# Patient Record
Sex: FEMALE | Race: BLACK OR AFRICAN AMERICAN | URBAN - METROPOLITAN AREA
[De-identification: names, ages, dates, MRNs, and addresses within clinical notes are randomized per-mention and may not be internally consistent; named-entity substitution may affect disease eponyms.]

---

## 2021-08-24 ENCOUNTER — OFFICE VISIT (OUTPATIENT)
Dept: FAMILY MEDICINE CLINIC | Age: 36
End: 2021-08-24
Payer: COMMERCIAL

## 2021-08-24 VITALS
HEART RATE: 80 BPM | OXYGEN SATURATION: 98 % | HEIGHT: 70 IN | SYSTOLIC BLOOD PRESSURE: 120 MMHG | DIASTOLIC BLOOD PRESSURE: 70 MMHG | WEIGHT: 176 LBS | TEMPERATURE: 98.1 F | BODY MASS INDEX: 25.2 KG/M2

## 2021-08-24 DIAGNOSIS — Z86.19 HISTORY OF HELICOBACTER PYLORI INFECTION: ICD-10-CM

## 2021-08-24 DIAGNOSIS — H81.13 BENIGN PAROXYSMAL POSITIONAL VERTIGO DUE TO BILATERAL VESTIBULAR DISORDER: ICD-10-CM

## 2021-08-24 DIAGNOSIS — Z13.220 SCREENING, LIPID: ICD-10-CM

## 2021-08-24 DIAGNOSIS — K21.9 GASTROESOPHAGEAL REFLUX DISEASE, UNSPECIFIED WHETHER ESOPHAGITIS PRESENT: Primary | ICD-10-CM

## 2021-08-24 DIAGNOSIS — Z13.1 SCREENING FOR DIABETES MELLITUS: ICD-10-CM

## 2021-08-24 PROCEDURE — 99204 OFFICE O/P NEW MOD 45 MIN: CPT | Performed by: FAMILY MEDICINE

## 2021-08-24 RX ORDER — MECLIZINE HCL 12.5 MG/1
12.5 TABLET ORAL 3 TIMES DAILY PRN
Qty: 30 TABLET | Refills: 0 | Status: SHIPPED | OUTPATIENT
Start: 2021-08-24 | End: 2021-09-03

## 2021-08-24 RX ORDER — OMEPRAZOLE 40 MG/1
40 CAPSULE, DELAYED RELEASE ORAL
Qty: 30 CAPSULE | Refills: 2 | Status: SHIPPED | OUTPATIENT
Start: 2021-08-24

## 2021-08-24 SDOH — ECONOMIC STABILITY: FOOD INSECURITY: WITHIN THE PAST 12 MONTHS, YOU WORRIED THAT YOUR FOOD WOULD RUN OUT BEFORE YOU GOT MONEY TO BUY MORE.: NEVER TRUE

## 2021-08-24 SDOH — ECONOMIC STABILITY: FOOD INSECURITY: WITHIN THE PAST 12 MONTHS, THE FOOD YOU BOUGHT JUST DIDN'T LAST AND YOU DIDN'T HAVE MONEY TO GET MORE.: NEVER TRUE

## 2021-08-24 ASSESSMENT — PATIENT HEALTH QUESTIONNAIRE - PHQ9
SUM OF ALL RESPONSES TO PHQ QUESTIONS 1-9: 0
SUM OF ALL RESPONSES TO PHQ QUESTIONS 1-9: 0
2. FEELING DOWN, DEPRESSED OR HOPELESS: 0
SUM OF ALL RESPONSES TO PHQ9 QUESTIONS 1 & 2: 0
1. LITTLE INTEREST OR PLEASURE IN DOING THINGS: 0
SUM OF ALL RESPONSES TO PHQ QUESTIONS 1-9: 0

## 2021-08-24 ASSESSMENT — ENCOUNTER SYMPTOMS
SORE THROAT: 0
BLOOD IN STOOL: 0
NAUSEA: 0
DIARRHEA: 0
VOMITING: 0
RHINORRHEA: 0
ABDOMINAL PAIN: 1
SHORTNESS OF BREATH: 0
CONSTIPATION: 0
CHEST TIGHTNESS: 0

## 2021-08-24 ASSESSMENT — SOCIAL DETERMINANTS OF HEALTH (SDOH): HOW HARD IS IT FOR YOU TO PAY FOR THE VERY BASICS LIKE FOOD, HOUSING, MEDICAL CARE, AND HEATING?: NOT HARD AT ALL

## 2021-08-24 NOTE — PATIENT INSTRUCTIONS
Patient Education     Benign Paroxysmal Positional Vertigo (BPPV): Care Instructions  Your Care Instructions     Benign paroxysmal positional vertigo, also called BPPV, is an inner ear problem. It causes a spinning or whirling sensation when you move your head. This sensation is called vertigo. The vertigo usually lasts for less than a minute. People often have vertigo spells for a few days or weeks. Then the vertigo goes away. But it may come back again. The vertigo may be mild, or it may be bad enough to cause unsteadiness, nausea, and vomiting. When you move, your inner ear sends messages to the brain. This helps you keep your balance. Vertigo can happen when debris builds up in the inner ear. The buildup can cause the inner ear to send the wrong message to the brain. Your doctor may move you in different positions to help your vertigo get better faster. This is called the Epley maneuver. Your doctor may also prescribe medicines or exercises to help with your symptoms. Follow-up care is a key part of your treatment and safety. Be sure to make and go to all appointments, and call your doctor if you are having problems. It's also a good idea to know your test results and keep a list of the medicines you take. How can you care for yourself at home? · If your doctor suggests that you do Harp-Daroff exercises:  ? Sit on the edge of a bed or sofa. Quickly lie down on the side that causes the worst vertigo. Lie on your side with your ear down. ? Stay in this position for at least 30 seconds or until the vertigo goes away. ? Sit up. If this causes vertigo, wait for it to stop. ? Repeat the procedure on the other side. ? Repeat this 10 times. Do these exercises 2 times a day until the vertigo is gone. When should you call for help? Call 911 anytime you think you may need emergency care. For example, call if:    · You have symptoms of a stroke.  These may include:  ? Sudden numbness, tingling, weakness, or loss of movement in your face, arm, or leg, especially on only one side of your body. ? Sudden vision changes. ? Sudden trouble speaking. ? Sudden confusion or trouble understanding simple statements. ? Sudden problems with walking or balance. ? A sudden, severe headache that is different from past headaches. Call your doctor now or seek immediate medical care if:    · You have new or worse nausea and vomiting.     · You have new symptoms such as hearing loss or roaring in your ears. Watch closely for changes in your health, and be sure to contact your doctor if:    · You are not getting better as expected.     · Your vertigo gets worse. Where can you learn more? Go to https://[employer] Vehiclespepiceweb.88tc88. org and sign in to your Pocket [employer] account. Enter  in the [employer] box to learn more about \"Benign Paroxysmal Positional Vertigo (BPPV): Care Instructions. \"     If you do not have an account, please click on the \"Sign Up Now\" link. Current as of: December 2, 2020               Content Version: 12.9  © 2044-7721 [employer]wise, Incorporated. Care instructions adapted under license by [facility] ([facility]). If you have questions about a medical condition or this instruction, always ask your healthcare professional. [place] [address] any warranty or liability for your use of this information.

## 2021-08-31 ENCOUNTER — TELEPHONE (OUTPATIENT)
Dept: FAMILY MEDICINE CLINIC | Age: 36
End: 2021-08-31

## 2021-08-31 NOTE — TELEPHONE ENCOUNTER
Pt  has been advised that pt has no anemia. Also advised that H. Pylori Breath Test is active and was ordered.

## 2021-09-06 LAB — H PYLORI BREATH TEST: NEGATIVE

## 2021-09-13 ENCOUNTER — HOSPITAL ENCOUNTER (OUTPATIENT)
Dept: PHYSICAL THERAPY | Age: 36
Setting detail: THERAPIES SERIES
Discharge: HOME | End: 2021-09-13
Payer: COMMERCIAL

## 2021-09-13 PROCEDURE — 95992 CANALITH REPOSITIONING PROC: CPT

## 2021-09-13 PROCEDURE — 97530 THERAPEUTIC ACTIVITIES: CPT

## 2021-09-13 PROCEDURE — 97162 PT EVAL MOD COMPLEX 30 MIN: CPT

## 2021-09-13 NOTE — PLAN OF CARE
[facility] Outpatient [facility]  [address] [place] [address], [address], [address]  Phone: [phone]   Fax: [phone]                                                       Physical Therapy Certification    Dear Referring Practitioner: Dr. Virgene Sacks,    We had the pleasure of evaluating the following patient for physical therapy services at [facility] ([facility]). A summary of our findings can be found in the initial assessment below. This includes our plan of care. If you have any questions or concerns regarding these findings, please do not hesitate to contact me at the office phone number checked above. Thank you for the referral.       Physician Signature:_______________________________Date:__________________  By signing above (or electronic signature), therapist's plan is approved by physician      Patient: Ashanti Juarez   : 1985   MRN: [id number]   Referring Physician: Referring Practitioner: Dr. Virgene Sacks       Evaluation Date: 2021       Medical Diagnosis Information:   Diagnosis: H81.13 (ICD-10-CM) - Benign paroxysmal positional vertigo due to bilateral vestibular disorder             Treatment diagnosis:   Dizziness due to BPPV                                  Insurance information: PT Insurance Information: [address]     Precautions/ Contra-indications:    Latex Allergy:  NO         Preferred Language for Healthcare:         other: [place] (virtual interpretation service used). Pt's  spoke [place] and was present for eval.     Time in:   1000      Timed Treatment: 1110 Total Treatment Time:  70  ______________________________________________________________________    SUBJECTIVE:      Referral Date: 21  Onset Date:  2021    Chief Complaint: spinning when lays down or gets up, when bends over or reaches up gets dizzy. Setting in which symptoms first occurred: when lying down at night became very dizzy. Also when woke up in the morning. Mult, slightly > R     Tandem stance:     Gait: WNL   Assistive Device:      Orthosis: At self-initiated pace:  Alva:    KASSI:         Arm swing:    Head/trunk rotation:       Straight path:   Swerves:       Staggers:    Side-steps:    Gait speed:     Oculomotor/Vestibular Examination:    Spontaneous nystagmus:  [] Left  [] Right [x] Absent  Gaze-Evoked nystagmus with fixation present:   Primary [] Present [x] Absent   Right  [] Present [x] Absent   Left  [] Present [x] Absent  VOR Head Thrust Test: [] Normal [] Abnormal  Comments: unable to complete fully due to cervical guarding. VOR Cancellation:  [] Normal [] Abnormal Comments: unable to complete fully due to cervical guarding. Smooth Pursuit:  [x] Normal [] Abnormal Comments:   Saccades:   [x] Normal [] Abnormal Comments:   Convergence:   [] Normal [] Abnormal Comments:   Static Visual Acuity:    Dynamic Visual Acuity:    Positional Testing  Test of provocation: [x] Negative  [] Positive    Positional Testing  R Hallpike-Longmont maneuver:    Nystagmus:  [x] Yes  [] No  [x] Duration:  1:20 min. [x] Direction: Vertical, possible R rotary   Vertigo:  [x] Yes  [] No  [x] Duration: 1:20 min. vague  L Hallpike-Longmont maneuver:   Nystagmus:  [x] Yes  [] No  [x] Duration: 25 sec      [x] Direction: Distinct L rotary w/ fatiguability   Vertigo:  [x] Yes  [] No  [x] Duration: 25 sec, pt confirms this is the same feeling of vertigo she has been having at home and felt more severe when compared to R shari-hallpike.    Supine roll head right:   Nystagmus:  [] Yes  [] No  [] Duration:       [] Direction:    Vertigo:  [] Yes  [] No  [] Duration:   Supine roll head left:   Nystagmus:  [] Yes  [] No  [] Duration:       [] Direction:    Vertigo:  [] Yes  [] No  [] Duration:     Outcome Measures  Dizziness Handicap Index (DHI)    40%  Functional Gait Assessment (FGA)      Parr Balance Scale        Timed Get \"Up and Go\"       5 time sit-to-stand        Activities Specific Balance Confidence Scale (ABC)  80% confidence  Motion Sensitivity Quotient (MSQ)      Visual Vertigo Analogue Scale (VVAS)       [x] Patient history, allergies, meds reviewed. Medical chart reviewed. See intake form. Review Of Systems (ROS):  [x]Performed Review of systems (Integumentary, CardioPulmonary, Neurological) by intake and observation. Intake form has been scanned into medical record. Patient has been instructed to contact their primary care physician regarding ROS issues if not already being addressed at this time. Co-morbidities/Complexities (which will affect course of rehabilitation):   [x]None           Arthritic conditions   []Rheumatoid arthritis (M05.9)  []Osteoarthritis (M19.91)   Cardiovascular conditions   []Hypertension (I10)  []Hyperlipidemia (E78.5)  []Angina pectoris (I20)  []Atherosclerosis (I70)   Musculoskeletal conditions   []Disc pathology   []Congenital spine pathologies   []Prior surgical intervention  []Osteoporosis (M81.8)  []Osteopenia (M85.8)   Endocrine conditions   []Hypothyroid (E03.9)  []Hyperthyroid Gastrointestinal conditions   []Constipation (Y79.67)   Metabolic conditions   []Morbid obesity (E66.01)  []Diabetes type 1(E10.65) or 2 (E11.65)   []Neuropathy (G60.9)     Pulmonary conditions   []Asthma (J45)  []Coughing   []COPD (J44.9)   Psychological Disorders  []Anxiety (F41.9)  []Depression (F32.9)   []Other:   []Other:            Barriers to/and or personal factors that will affect rehab potential:             []Age  []Sex    []Smoker              []Motivation/Lack of Motivation                        []Co-Morbidities              [x]Cognitive Function, education/learning barriers- language barrier              []Environmental, home barriers              []profession/work barriers  []past PT/medical experience  []other:  Justification:     Falls Risk Assessment (30 days):   [x] Falls Risk assessed and no intervention required.   [] Falls Risk assessed and Patient requires intervention due to being higher risk   [] TUG score (>12s at risk):  [] Falls education provided, including     ASSESSMENT: Pt is 39 y.o. female w/ sudden onset of vertigo when attempting sit to supine on 7/25/21. Since then she has continued to experience vertigo w/ bed mobility daily as well as dizziness w/ active cervical flexion and extension. She reports overall all it has been unchanged since then. She has limited her activities that require looking/reaching up, bending over and requires increased time to sit up and wait to allow resolution of symptoms before getting out of bed. Her CTSIB was consistent w/ vestibular dysfunction. Her VOR testing was inconclusive due to cervical guarding. Her shari-hallpike was inconclusive due to positive signs and mild symptoms on the R, but more obvious signs and symptoms that match her reported symptoms when experiencing vertigo at home, therefor she was treated w/ the L eply maneuver and then subsequently had a negative L shari-hallpike. Next session will re-test shari-hallpike on both sides to ensure full resolution of both sides. She will benefit from skilled PT to assess and treat for full resolution of vertigo in order to return to all previous activities.      Functional Impairments:     [x] BPPV (will benefit from further assessment to ensure symptoms experienced on R were actually from deficit on L side)    [] Right [x] Posteror Canal [x] Canalithiasis    [x] Left  [] Horizontal Canal [] Cupulolithiasis   []Decreased Gaze Stabilization   []Increased Motion Sensitivity   []Unilateral Vestibular Hypofunction   []Bilateral Vestibular Hypofunction   []Gait Instability   [x]Decreased tolerance for ADLs       []Decreased functional strength    []Reduced Balance/Proprioceptive control     []Reduced ability to hear/focus    []Noted cervical/thoracic/GHJ joint hypomobility    []Noted cervical/thoracic/GHJ joint hypermobility    []Decreased cervical/UE functional ROM    []Noted Headache pain aggravated by neck movements with/without dizziness    []Abnormal reflexes/sensation/myotomal/dermatomal deficits    []Decreased DCF control or ability to hold head up    []Decreased RC/scapular/core strength and neuromuscular control    []Other:     Functional Activity Limitations (from functional questionnaire and intake)   []Reduced ability to tolerate prolonged functional positions   [x]Reduced ability or difficulty with changes of positions or transfers between positions    [x]Reduced ability to transfer in/out of bed or rolling in bed    []Reduced ability or tolerance with driving, reading and/or computer work    [x]Reduced ability to perform lifting, reaching, carrying tasks    [x]Reduced ability to forward bend   []Reduced ability to ambulate prolonged functional periods/distances/surfaces    []Reduced ability to ascend/descend stairs    []Reduced ability to concentrate/focus    []Reduced ability to turn/pitch head rapidly    []Reduced ability to self-correct for losses of balance   []Other:        Participation Restrictions    [x]Reduced participation in self care activities    [x]Reduced participation in home management activities    []Reduced participation in work activities    []Reduced participation in social activities    []Reduced participation in sport/recreational activities    Classification :    [x]Signs/symptoms consistent with BPPV (benign paroxysmal positional vertigo)       []Signs/symptoms consistent with unilateral peripheral vestibulopathy (i.e., vestibular neuritis, labyrinthitis, acoustic neuroma)     []Signs/symptoms consistent with central vestibulopathy    []Signs/symptoms consistent with migraine-related vestibulopathy    []Signs/symptoms consistent with Menieres disease / post-traumatic hydrops    []Signs/symptoms consistent with perilymphatic fistula    []Signs/symptoms consistent with cervicogenic dizziness    []Signs/symptoms consistent with gait instability    []Signs/symptoms consistent with motion sensitivity      []Signs/symptoms consistent with neck pain with mobility deficits      []Signs/symptoms consistent with neck pain with movement coordinated impairments     []Signs/symptoms consistent with neck pain with radiating pain     []Signs/symptoms consistent with neck pain with headaches (cervicogenic)     []Signs/symptoms consistent with nerve root involvement including myotome & dermatome dysfunction    []Signs/symptoms consistent with facet dysfunction of cervical and thoracic spine     []Signs/symptoms consistent suggesting central cord compression/UMN syndromes    []Signs/symptoms consistent with discogenic cervical pain    []Signs/symptoms consistent with rib dysfunction    []Signs/symptoms consistent with postural dysfunction    []Signs/symptoms consistent with shoulder pathology     []Signs/symptoms consistent with post-surgical status including decreased ROM, strength and function.     []Signs/symptoms which may limit the use of advanced manual therapy techniques: (Elevated CV risk profile, recent trauma, intolerance to end range positions, prior TIA, visual issues, UE neurological compromise)    []Other:      Prognosis/Rehab Potential:      [x]Excellent    []Good    []Fair    []Poor    Tolerance of evaluation/treatment:     [x]Excellent    []Good    []Fair    []Poor     Physical Therapy Evaluation Complexity Justification   [x] A history of present problem with:  [] no personal factors and/or comorbidities that impact the plan of care;  [x]1-2 personal factors and/or comorbidities that impact the plan of care  []3 personal factors and/or comorbidities that impact the plan of care  [x] An examination of body systems using standardized tests and measures addressing any of the following: body structures and functions (impairments), activity limitations, and/or participation restrictions;:  [] a total of 1-2 or more elements   [x] a total of 3 or Adjusted    Therapist goals for Patient:  Short Term = Long term Goals: To be achieved in: 3 weeks  1. Pt will report no episodes of vertigo for 2 weeks. [] Progressing: [] Met: [] Not Met: [] Adjusted  2. Pt will report DHI of <4% impaired to demonstrate improved function. [] Progressing: [] Met: [] Not Met: [] Adjusted  3. Pt will report ability to get in and out of bed without symptoms. [] Progressing: [] Met: [] Not Met: [] Adjusted  4. Pt will demonstrate active cervical flexion and extension without symptoms.    [] Progressing: [] Met: [] Not Met: [] Adjusted      Electronically signed by:  Blanche Moreland, PT, DPT

## 2021-09-14 NOTE — FLOWSHEET NOTE
[facility] Outpatient [facility]  [address] [place] [address] [address], [address]  Phone: [phone]   Fax: [phone]    Physical Therapy Treatment Note/ Progress Report:     Date:  2021    Patient Name:  Chad Harris    :  1985  MRN: [id number]    Medical/Treatment Diagnosis Information:  · Diagnosis: H81.13 (ICD-10-CM) - Benign paroxysmal positional vertigo due to bilateral vestibular disorder  ·  impaired tolerance of transitional movements due to dizziness  Insurance/Certification information:  PT Insurance Information: [address]  Physician Information:  Referring Practitioner: Dr. Stratton Port of care signed:    [] Yes  [x] No    Date of Patient follow up with Physician:      Progress Report: []  Yes  [x]  No     Date Range for reporting period:  Beginnin21  Ending:      Progress report due (10 Rx/or 30 days whichever is less):      Recertification due (POC duration/ or 90 days whichever is less): 21     Visit # Insurance Allowable Auth Needed    BMN []Yes   [x]No     RESTRICTIONS/PRECAUTIONS:   Latex Allergy:  [x]NO      []YES  Preferred Language for Healthcare:   []English       [x]other:[employer] (virtual interpretation service used). Pt's  spoke [employer] and was present for eval  Functional Scale: DHI 40% impaired;  ABC scale 80% confidence Date assessed:21    Pain level:  0/10   Dizziness: 8/10    SUBJECTIVE:  See eval    OBJECTIVE: See eval   Observation: Pts  and 1year old daughter present for eval.     Test measurements:      R Hallpike-Durand maneuver:               Nystagmus:     [x]? Yes             []? No               [x]? Duration:    1:20 min. [x]? Direction:    Vertical, possible R rotary              Vertigo:            [x]? Yes             []? No               [x]? Duration: 1:20 min. vague  L Hallpike-Durand maneuver:              Nystagmus:     [x]?  Yes []? No               [x]? Duration: 25 sec                                          [x]? Direction:    Distinct L rotary w/ fatiguability              Vertigo:            [x]? Yes             []? No               [x]? Duration: 25 sec, pt confirms this is the same feeling of vertigo she has been having at home and felt more severe when compared to R shari-hallpike. Exercises/Interventions: Exercises in bold performed in department today. Items not bolded are carried forward from prior visits for continuity of the record. Exercise/Equipment Resistance/Repetitions HEP Other comments     []      []      []      []      []      []      []      []      []      []      []      []      []      []      []      []      []      []    CRT: L eply- no symptoms  L shari-hallpike after treatment: negative, except one very brief but large sensation of spinning that self resolved in ~5 seconds after had been sitting up for ~15 seconds. Home Exercise Program:TBD      Therapeutic Exercise:   [] (67220) Provided verbal/tactile cueing for activities related to strengthening, flexibility, endurance, ROM for improvements in LE, proximal hip, and core control with self-care, mobility, lifting, ambulation. NMR:  [] (54766) Provided verbal/tactile cueing for activities related to improving balance, coordination, kinesthetic sense, posture, motor skill, proprioception to assist with LE, proximal hip, and core control in self-care, mobility, lifting, ambulation and eccentric single leg control. Therapeutic Activities:    [x] (27540) Provided verbal/tactile cueing for activities related to improving balance, coordination, kinesthetic sense, posture, motor skill, proprioception and motor activation to allow for proper function of core, proximal hip and LE with self-care and ADLs and functional mobility.      Gait Training:    [] (65984) Provided training and instruction to the patient for proper LE, core and proximal hip recruitment and positioning and eccentric body weight control with ambulation re-education including up and down stairs     Manual Treatments:  PROM / STM / Oscillations-Mobs:  G-I, II, III, IV (PA's, Inf., Post.)  [] (17534) Provided manual therapy to mobilize LE, proximal hip and/or LS spine soft tissue/joints for the purpose of modulating pain, promoting relaxation,  increasing ROM, reducing/eliminating soft tissue swelling/inflammation/restriction, improving soft tissue extensibility and allowing for proper ROM for normal function with self-care, mobility, lifting and ambulation. Home Exercise Program:    [] (37898) Reviewed/Progressed HEP activities related to strengthening, flexibility, endurance, ROM of core, proximal hip and LE for functional self-care, mobility, lifting and ambulation/stair navigation   [] (94631) Reviewed/Progressed HEP activities related to improving balance, coordination, kinesthetic sense, posture, motor skill, proprioception of core, proximal hip and LE for self-care, mobility, lifting, and ambulation/stair navigation      Modalities:    [] Electric Stimulation:   [] Ultrasound:   [x] Other: CRT x 1      Charges:  Timed Code Treatment Minutes: TA 25   Total Treatment Minutes: 40      [] EVAL (LOW) 11369 (typically 20 minutes face-to-face)  [x] EVAL (MOD) 48044 (typically 30 minutes face-to-face)  [] EVAL (HIGH) 66118 (typically 45 minutes face-to-face)  [] RE-EVAL     [] LH(72461) x       [] NMR (00447) x       [] Manual (68244) x       [x] TA (98746) x   2    [] Gait Training (47904) x       [] ES(attended) (63725)  [] ES (un) (60303)   [] DRY NEEDLE 1 OR 2 MUSCLES  [] DRY NEEDLE 3+ MUSCLES  [] Mech Traction (06636)  [] Ultrasound (01010)  [x] Other:CRT x 1          GOALS: Goals established 9/13/21   Patient stated goal: to not get dizzy getting in/out of bed  []? Progressing: []? Met: []? Not Met: []? Adjusted     Therapist goals for Patient:  Short Term = Long term Goals:  To be

## 2021-09-15 ENCOUNTER — HOSPITAL ENCOUNTER (OUTPATIENT)
Dept: PHYSICAL THERAPY | Age: 36
Setting detail: THERAPIES SERIES
Discharge: HOME | End: 2021-09-15
Payer: COMMERCIAL

## 2021-09-15 PROCEDURE — 97530 THERAPEUTIC ACTIVITIES: CPT

## 2021-09-15 NOTE — FLOWSHEET NOTE
[facility]  [address], [place] [address], [address]  Phone: [phone]   Fax: [phone]    Physical Therapy Treatment Note/ Progress Report:     Date:  9/15/2021    Patient Name:  Miya Chan    :  1985  MRN: [id number]    Medical/Treatment Diagnosis Information:  · Diagnosis: H81.13 (ICD-10-CM) - Benign paroxysmal positional vertigo due to bilateral vestibular disorder  ·  impaired tolerance of transitional movements due to dizziness  Insurance/Certification information:  PT Insurance Information: [address]  Physician Information:  Referring Practitioner: Dr. Nikita Pulido of care signed:    [x] Yes  [] No    Date of Patient follow up with Physician:       Progress Report: []  Yes  [x]  No     Date Range for reporting period:  Beginnin21  Ending:      Progress report due (10 Rx/or 30 days whichever is less):      Recertification due (POC duration/ or 90 days whichever is less): 21     Visit # Insurance Allowable Auth Needed   / BMN []Yes   [x]No     RESTRICTIONS/PRECAUTIONS:   Latex Allergy:  [x]NO      []YES  Preferred Language for Healthcare:   []English       [x]other:[employer] (audio interpretation service used). Functional Scale: DHI 40% impaired;  ABC scale 80% confidence Date assessed:21    Pain level:  0/10   Dizziness: 6/10 with bending forward    SUBJECTIVE:  Pt reports no dizziness when getting and out of bed since Monday, but still some dizziness when bending over. OBJECTIVE:    Observation: Pt arrives alone, no gait deviations.  Test measurements:      R Hallpike-Pj maneuver:               Nystagmus:     []? Yes             [x]? No               []? Duration:                                         []? Direction:                  Vertigo:            []? Yes             [x]? No               []? Duration:   L Hallpike-Langtry maneuver:              Nystagmus:     []? Yes             [x]? No               []? Duration:                                           []? Direction:              Vertigo:            []? Yes             [x]? No               []? Duration:     Attempted to reproduce residual dizziness, seated bend over forward and return to sitting up quickly-No dizziness; standing bend over and stand back up x 3- no dizziness. Exercises/Interventions: Exercises in bold performed in department today. Items not bolded are carried forward from prior visits for continuity of the record. Exercise/Equipment Resistance/Repetitions HEP Other comments     []      []      []      []      []      []      []      []      []      []      []      []      []      []      []      []      []      []    TA: Following all assessment and no reproducible dizziness/vertigo with bending over, encouraged pt to continue to monitor for dizziness during bed mobility and bending over. If vertigo returns, call to make an appt for reassessment. Pt verbalized understanding. Home Exercise Program:N/A      Therapeutic Exercise:   [] (56949) Provided verbal/tactile cueing for activities related to strengthening, flexibility, endurance, ROM for improvements in LE, proximal hip, and core control with self-care, mobility, lifting, ambulation. NMR:  [] (99336) Provided verbal/tactile cueing for activities related to improving balance, coordination, kinesthetic sense, posture, motor skill, proprioception to assist with LE, proximal hip, and core control in self-care, mobility, lifting, ambulation and eccentric single leg control. Therapeutic Activities:    [x] (89241) Provided verbal/tactile cueing for activities related to improving balance, coordination, kinesthetic sense, posture, motor skill, proprioception and motor activation to allow for proper function of core, proximal hip and LE with self-care and ADLs and functional mobility.      Gait Training:    [] (35983) Provided training and instruction to the patient for proper LE, core and proximal hip recruitment and positioning and eccentric body weight control with ambulation re-education including up and down stairs     Manual Treatments:  PROM / STM / Oscillations-Mobs:  G-I, II, III, IV (PA's, Inf., Post.)  [] (92686) Provided manual therapy to mobilize LE, proximal hip and/or LS spine soft tissue/joints for the purpose of modulating pain, promoting relaxation,  increasing ROM, reducing/eliminating soft tissue swelling/inflammation/restriction, improving soft tissue extensibility and allowing for proper ROM for normal function with self-care, mobility, lifting and ambulation. Home Exercise Program:    [] (29146) Reviewed/Progressed HEP activities related to strengthening, flexibility, endurance, ROM of core, proximal hip and LE for functional self-care, mobility, lifting and ambulation/stair navigation   [] (25302) Reviewed/Progressed HEP activities related to improving balance, coordination, kinesthetic sense, posture, motor skill, proprioception of core, proximal hip and LE for self-care, mobility, lifting, and ambulation/stair navigation      Modalities:    [] Electric Stimulation:   [] Ultrasound:   [] Other:     Charges:  Timed Code Treatment Minutes: TA 25   Total Treatment Minutes: 25      [] EVAL (LOW) 78090 (typically 20 minutes face-to-face)  [] EVAL (MOD) 40467 (typically 30 minutes face-to-face)  [] EVAL (HIGH) 79637 (typically 45 minutes face-to-face)  [] RE-EVAL     [] RH(10400) x       [] NMR (44296) x       [] Manual (76257) x       [x] TA (80711) x   2    [] Gait Training (17564) x       [] ES(attended) (79850)  [] ES (un) (18844)   [] DRY NEEDLE 1 OR 2 MUSCLES  [] DRY NEEDLE 3+ MUSCLES  [] Mech Traction (24203)  [] Ultrasound (13362)  [] Other:CRT x 1          GOALS: Goals established 9/13/21   Patient stated goal: to not get dizzy getting in/out of bed  []? Progressing: [x]? Met: []? Not Met: []?  Adjusted     Therapist goals for Patient:  Short Term = Long term Goals: To be achieved in: 3 weeks  1. Pt will report no episodes of vertigo for 2 weeks. - Met for past 2 days. []? Progressing: []? Met: []? Not Met: []? Adjusted  2. Pt will report DHI of <4% impaired to demonstrate improved function. []? Progressing: []? Met: []? Not Met: []? Adjusted  3. Pt will report ability to get in and out of bed without symptoms. []? Progressing: [x]? Met: []? Not Met: []? Adjusted  4. Pt will demonstrate active cervical flexion and extension without symptoms. []? Progressing: [x]? Met: []? Not Met: []? Adjusted    ASSESSMENT:  Pt's assessment was negative for BPPV on both sides today. We were unable to reproduce dizziness with looking down or bending over forward from sitting or standing. Pt has no further complaints and is satisfied w/ outcome at this time. Encouraged pt to continue to monitor, if symptoms return then pt to return to clinic. Most likely pt to be discharged if no further contact for 30 days. Treatment/Activity Tolerance:  [x] Patient tolerated treatment well [] Patient limited by fatique  [] Patient limited by pain  [] Patient limited by other medical complications  [] Other:     Overall Progression Towards Functional goals/ Treatment Progress Update:  [x] Patient is progressing as expected towards functional goals listed. [] Progression is slowed due to complexities/Impairments listed. [] Progression has been slowed due to co-morbidities.   [] Plan just implemented, too soon to assess goals progression <30days   [] Goals require adjustment due to lack of progress  [] Patient is not progressing as expected and requires additional follow up with physician  [] Other    Prognosis for POC: [x] Good [] Fair  [] Poor    Patient requires continued skilled intervention: [x] Yes  [] No        PLAN:   [x] Continue per plan of care [] Alter current plan (see comments)  [x] Plan of care initiated [] Hold pending MD visit [] Discharge (most likely assuming no return of symptoms in next 30 days). Electronically signed by: Maria Victoria Echeverria PT, DPT    Note: If patient does not return for scheduled/recommended follow up visits, this note will serve as a discharge from care along with the most recent update on progress.

## 2021-09-21 ENCOUNTER — APPOINTMENT (OUTPATIENT)
Dept: PHYSICAL THERAPY | Age: 36
End: 2021-09-21
Payer: COMMERCIAL

## 2021-09-23 ENCOUNTER — APPOINTMENT (OUTPATIENT)
Dept: PHYSICAL THERAPY | Age: 36
End: 2021-09-23
Payer: COMMERCIAL

## 2022-02-11 ENCOUNTER — NURSE TRIAGE (OUTPATIENT)
Dept: OTHER | Facility: CLINIC | Age: 37
End: 2022-02-11

## 2022-02-11 ENCOUNTER — TELEPHONE (OUTPATIENT)
Dept: FAMILY MEDICINE CLINIC | Age: 37
End: 2022-02-11

## 2022-02-11 NOTE — TELEPHONE ENCOUNTER
Received call from [facility] at [facility] with [employer] Complaint. Kait Siddiqui ,       LIMITED TRIAGE AS PT IS NOT PRESENT DURING CALL     Subjective: Caller states \"It's been more than 1 week. She is having chest pain only when she coughs - just under the rib area - it's improving, but hasn't gone away. \"     Current Symptoms:   Dry cough (chest pain only present when coughing and immediately resolves) -  reports pt has no other symptoms at this time    Pt has not been tested for COVID - pt has been vaccinated     Denies shortness of breath     Onset: 1 week ago; improving    Associated Symptoms: NA    Pain Severity: Denies pain - only when coughing     Temperature: Denies fever     What has been tried: Cough syrup     LMP: NA Pregnant: NA    Recommended disposition: SEE IN OFFICE TODAY:IF NO AVAILABLE APPOINTMENTS:  You need to be seen in the [facility]. Go there today. Care advice provided, patient verbalizes understanding; denies any other questions or concerns; instructed to call back for any new or worsening symptoms. Writer provided warm transfer to Juan Ramon Roger at [facility] for appointment scheduling     Attention Provider: Thank you for allowing me to participate in the care of your patient. The patient was connected to triage in response to information provided to the [facility]. Please do not respond through this encounter as the response is not directed to a shared pool.     Reason for Disposition   Chest pain(s) lasting a few seconds persists > 3 days    Protocols used: CHEST PAIN-ADULT-[place]

## 2022-02-11 NOTE — TELEPHONE ENCOUNTER
----- Message from Lari Lanes sent at 2/11/2022 12:41 PM EST -----  Subject: Appointment Request    Reason for Call: Immediate Return from RN Triage    QUESTIONS  Type of Appointment? Established Patient  Reason for appointment request? No appointments available during search  Additional Information for Provider? patient is rnt for dry cough and   chest pain disposition was for today and there are no appointments pulling   up while search ; patient request a call back and will follow instruction   by the nurse .  ---------------------------------------------------------------------------  --------------  CALL BACK INFO  What is the best way for the office to contact you? OK to leave message on   voicemail  Preferred Call Back Phone Number? [phone]  ---------------------------------------------------------------------------  --------------  SCRIPT ANSWERS  Patient needs to be seen today? Yes   Nurse Name? summer  Have you been diagnosed with, awaiting test results for, or told that you   are suspected of having COVID-19 (Coronavirus)? (If patient has tested   negative or was tested as a requirement for work, school, or travel and   not based on symptoms, answer no)? No  Within the past two weeks have you developed any of the following symptoms   (answer no if symptoms have been present longer than 2 weeks or began   more than 2 weeks ago)? Fever or Chills, Cough, Shortness of breath or   difficulty breathing, Loss of taste or smell, Sore throat, Nasal   congestion, Sneezing or runny nose, Fatigue or generalized body aches   (answer no if pain is specific to a body part e.g. back pain), Diarrhea,   Headache?  Yes
